# Patient Record
Sex: MALE | Race: WHITE | HISPANIC OR LATINO | URBAN - METROPOLITAN AREA
[De-identification: names, ages, dates, MRNs, and addresses within clinical notes are randomized per-mention and may not be internally consistent; named-entity substitution may affect disease eponyms.]

---

## 2023-12-27 ENCOUNTER — EMERGENCY (EMERGENCY)
Facility: HOSPITAL | Age: 63
LOS: 0 days | Discharge: HOME | End: 2023-12-27
Attending: EMERGENCY MEDICINE
Payer: COMMERCIAL

## 2023-12-27 VITALS
RESPIRATION RATE: 20 BRPM | WEIGHT: 197.98 LBS | TEMPERATURE: 99 F | HEIGHT: 66 IN | DIASTOLIC BLOOD PRESSURE: 89 MMHG | HEART RATE: 54 BPM | SYSTOLIC BLOOD PRESSURE: 168 MMHG | OXYGEN SATURATION: 97 %

## 2023-12-27 DIAGNOSIS — R20.2 PARESTHESIA OF SKIN: ICD-10-CM

## 2023-12-27 DIAGNOSIS — R20.0 ANESTHESIA OF SKIN: ICD-10-CM

## 2023-12-27 DIAGNOSIS — E11.9 TYPE 2 DIABETES MELLITUS WITHOUT COMPLICATIONS: ICD-10-CM

## 2023-12-27 DIAGNOSIS — Z88.8 ALLERGY STATUS TO OTHER DRUGS, MEDICAMENTS AND BIOLOGICAL SUBSTANCES: ICD-10-CM

## 2023-12-27 LAB
ANION GAP SERPL CALC-SCNC: 16 MMOL/L — HIGH (ref 7–14)
ANION GAP SERPL CALC-SCNC: 16 MMOL/L — HIGH (ref 7–14)
BUN SERPL-MCNC: 10 MG/DL — SIGNIFICANT CHANGE UP (ref 10–20)
BUN SERPL-MCNC: 10 MG/DL — SIGNIFICANT CHANGE UP (ref 10–20)
CALCIUM SERPL-MCNC: 9.8 MG/DL — SIGNIFICANT CHANGE UP (ref 8.4–10.5)
CALCIUM SERPL-MCNC: 9.8 MG/DL — SIGNIFICANT CHANGE UP (ref 8.4–10.5)
CHLORIDE SERPL-SCNC: 100 MMOL/L — SIGNIFICANT CHANGE UP (ref 98–110)
CHLORIDE SERPL-SCNC: 100 MMOL/L — SIGNIFICANT CHANGE UP (ref 98–110)
CO2 SERPL-SCNC: 23 MMOL/L — SIGNIFICANT CHANGE UP (ref 17–32)
CO2 SERPL-SCNC: 23 MMOL/L — SIGNIFICANT CHANGE UP (ref 17–32)
CREAT SERPL-MCNC: 0.8 MG/DL — SIGNIFICANT CHANGE UP (ref 0.7–1.5)
CREAT SERPL-MCNC: 0.8 MG/DL — SIGNIFICANT CHANGE UP (ref 0.7–1.5)
EGFR: 100 ML/MIN/1.73M2 — SIGNIFICANT CHANGE UP
EGFR: 100 ML/MIN/1.73M2 — SIGNIFICANT CHANGE UP
GLUCOSE SERPL-MCNC: 193 MG/DL — HIGH (ref 70–99)
GLUCOSE SERPL-MCNC: 193 MG/DL — HIGH (ref 70–99)
HCT VFR BLD CALC: 44.2 % — SIGNIFICANT CHANGE UP (ref 42–52)
HCT VFR BLD CALC: 44.2 % — SIGNIFICANT CHANGE UP (ref 42–52)
HGB BLD-MCNC: 14.4 G/DL — SIGNIFICANT CHANGE UP (ref 14–18)
HGB BLD-MCNC: 14.4 G/DL — SIGNIFICANT CHANGE UP (ref 14–18)
MAGNESIUM SERPL-MCNC: 2.1 MG/DL — SIGNIFICANT CHANGE UP (ref 1.8–2.4)
MAGNESIUM SERPL-MCNC: 2.1 MG/DL — SIGNIFICANT CHANGE UP (ref 1.8–2.4)
MCHC RBC-ENTMCNC: 29.5 PG — SIGNIFICANT CHANGE UP (ref 27–31)
MCHC RBC-ENTMCNC: 29.5 PG — SIGNIFICANT CHANGE UP (ref 27–31)
MCHC RBC-ENTMCNC: 32.6 G/DL — SIGNIFICANT CHANGE UP (ref 32–37)
MCHC RBC-ENTMCNC: 32.6 G/DL — SIGNIFICANT CHANGE UP (ref 32–37)
MCV RBC AUTO: 90.6 FL — SIGNIFICANT CHANGE UP (ref 80–94)
MCV RBC AUTO: 90.6 FL — SIGNIFICANT CHANGE UP (ref 80–94)
NRBC # BLD: 0 /100 WBCS — SIGNIFICANT CHANGE UP (ref 0–0)
NRBC # BLD: 0 /100 WBCS — SIGNIFICANT CHANGE UP (ref 0–0)
PLATELET # BLD AUTO: 299 K/UL — SIGNIFICANT CHANGE UP (ref 130–400)
PLATELET # BLD AUTO: 299 K/UL — SIGNIFICANT CHANGE UP (ref 130–400)
PMV BLD: 9.3 FL — SIGNIFICANT CHANGE UP (ref 7.4–10.4)
PMV BLD: 9.3 FL — SIGNIFICANT CHANGE UP (ref 7.4–10.4)
POTASSIUM SERPL-MCNC: 4.4 MMOL/L — SIGNIFICANT CHANGE UP (ref 3.5–5)
POTASSIUM SERPL-MCNC: 4.4 MMOL/L — SIGNIFICANT CHANGE UP (ref 3.5–5)
POTASSIUM SERPL-SCNC: 4.4 MMOL/L — SIGNIFICANT CHANGE UP (ref 3.5–5)
POTASSIUM SERPL-SCNC: 4.4 MMOL/L — SIGNIFICANT CHANGE UP (ref 3.5–5)
RBC # BLD: 4.88 M/UL — SIGNIFICANT CHANGE UP (ref 4.7–6.1)
RBC # BLD: 4.88 M/UL — SIGNIFICANT CHANGE UP (ref 4.7–6.1)
RBC # FLD: 13.2 % — SIGNIFICANT CHANGE UP (ref 11.5–14.5)
RBC # FLD: 13.2 % — SIGNIFICANT CHANGE UP (ref 11.5–14.5)
SODIUM SERPL-SCNC: 139 MMOL/L — SIGNIFICANT CHANGE UP (ref 135–146)
SODIUM SERPL-SCNC: 139 MMOL/L — SIGNIFICANT CHANGE UP (ref 135–146)
WBC # BLD: 11.06 K/UL — HIGH (ref 4.8–10.8)
WBC # BLD: 11.06 K/UL — HIGH (ref 4.8–10.8)
WBC # FLD AUTO: 11.06 K/UL — HIGH (ref 4.8–10.8)
WBC # FLD AUTO: 11.06 K/UL — HIGH (ref 4.8–10.8)

## 2023-12-27 PROCEDURE — 80048 BASIC METABOLIC PNL TOTAL CA: CPT

## 2023-12-27 PROCEDURE — 99284 EMERGENCY DEPT VISIT MOD MDM: CPT

## 2023-12-27 PROCEDURE — 83735 ASSAY OF MAGNESIUM: CPT

## 2023-12-27 PROCEDURE — 36415 COLL VENOUS BLD VENIPUNCTURE: CPT

## 2023-12-27 PROCEDURE — 85027 COMPLETE CBC AUTOMATED: CPT

## 2023-12-27 PROCEDURE — 99283 EMERGENCY DEPT VISIT LOW MDM: CPT

## 2023-12-27 NOTE — ED PROVIDER NOTE - CARE PROVIDERS DIRECT ADDRESSES
Color consistent with ethnicity/race, warm, dry intact, resilient. ,DirectAddress_Unknown,[email or web address] ,DirectAddress_Unknown,[email or web address]

## 2023-12-27 NOTE — ED PROVIDER NOTE - OBJECTIVE STATEMENT
63 year old male with pmhx of carpal tunnel, presents to ed with bilateral hand numbness x 4 days. Pt admits usually has numbness to 1st 2nd and 3rd digits, but now its entire hand. Pt moved 3 weeks ago and did a lot of heavy lifting. no direct trauma. no swelling or pain. no ha, dizziness, neck or back pain.

## 2023-12-27 NOTE — ED PROVIDER NOTE - NS ED ATTENDING STATEMENT MOD
This was a shared visit with the MOON. I reviewed and verified the documentation and independently performed the documented:

## 2023-12-27 NOTE — ED PROVIDER NOTE - ATTENDING APP SHARED VISIT CONTRIBUTION OF CARE
79-year-old male history of diabetes peripheral neuropathy presents for evaluation of bilateral hand tingling.  Patient reports he has been having pain and tingling in his hands the past 3 days.  He has no fever chills focal weakness numbness tingling.  Does have a mild pain in his neck.  Here exam patient with full strength of the bilateral upper extremities sensation grossly intact bilateral hands full range of motion of neck no midline spinal tenderness cranials 2-12 intact normal gait  Impression   patient with bilateral hand paresthesias.  Perhaps cervical in etiology.  Screening labs gross within normal limits outpatient follow-up workup advised.

## 2023-12-27 NOTE — ED PROVIDER NOTE - PHYSICAL EXAMINATION
GEN: Patient in no acute distress  MS: Normal ROM in all extremities. No midline spinal tenderness. pulses 2 +.   SKIN: Warm, dry, no acute rashes. Good turgor  NEURO: Strength 5/5 with no sensory deficits to bilateral extremities, radial, ulna, and medial nerve intact

## 2023-12-27 NOTE — ED PROVIDER NOTE - CARE PROVIDER_API CALL
Kaleb Montano  Orthopaedic Surgery  [address] [place] [address]  Phone: [phone]  Fax: [phone]  Follow Up Time:     Jose Lerner  Neurology  [address] [place] [address], [address]  [place] [address]  Phone: [phone]  Fax: [phone]  Follow Up Time:    Kaleb Montano  Orthopaedic Surgery  [address] [place] [address]  Phone: [phone]  Fax: [phone]  Follow Up Time:     Jose Lerner  Neurology  [address] [place] [address], [address]  [place] [address]  Phone: [phone]  Fax: [phone]  Follow Up Time:

## 2023-12-27 NOTE — ED ADULT NURSE NOTE - NSFALLUNIVINTERV_ED_ALL_ED
Bed/Stretcher in lowest position, wheels locked, appropriate side rails in place/Call bell, personal items and telephone in reach/Instruct patient to call for assistance before getting out of bed/chair/stretcher/Non-slip footwear applied when patient is off stretcher/[place] to call system/Physically safe environment - no spills, clutter or unnecessary equipment/Purposeful proactive rounding/Room/bathroom lighting operational, light cord in reach Bed/Stretcher in lowest position, wheels locked, appropriate side rails in place/Call bell, personal items and telephone in reach/Instruct patient to call for assistance before getting out of bed/chair/stretcher/Non-slip footwear applied when patient is off stretcher/[place] to call system/Physically safe environment - no spills, clutter or unnecessary equipment/Purposeful proactive rounding/Room/bathroom lighting operational, light cord in reach

## 2023-12-27 NOTE — ED PROVIDER NOTE - PATIENT PORTAL LINK FT
You can access the FollowMyHealth Patient Portal offered by [facility] by registering at the following website: [email or web address]. By joining [employer]’s FollowMyHealth portal, you will also be able to view your health information using other applications (apps) compatible with our system. You can access the FollowMyHealth Patient Portal offered by [facility] by registering at the following website: [email or web address]. By joining [employer]’s FollowMyHealth portal, you will also be able to view your health information using other applications (apps) compatible with our system.

## 2023-12-27 NOTE — ED PROVIDER NOTE - CLINICAL SUMMARY MEDICAL DECISION MAKING FREE TEXT BOX
patient with bilateral hand paresthesias.  Perhaps cervical in etiology.  Screening labs gross within normal limits outpatient follow-up workup advised.

## 2023-12-27 NOTE — ED PROVIDER NOTE - PROVIDER TOKENS
PROVIDER:[TOKEN:[[id number]]],PROVIDER:[TOKEN:[[id number]]] PROVIDER:[TOKEN:[[id number]]],PROVIDER:[TOKEN:[[id number]]]

## 2023-12-27 NOTE — ED ADULT TRIAGE NOTE - CHIEF COMPLAINT QUOTE
Patient c/o numbness to bilateral hands and all ten fingers x 3 days, patient reports history of carpel tunnel and diabetic neuropathy that previously only affected 3/5 fingers on each hand

## 2024-01-04 ENCOUNTER — NON-APPOINTMENT (OUTPATIENT)
Age: 64
End: 2024-01-04

## 2024-02-16 PROBLEM — G56.00 CARPAL TUNNEL SYNDROME, UNSPECIFIED UPPER LIMB: Chronic | Status: ACTIVE | Noted: 2023-12-27

## 2024-02-16 PROBLEM — G62.9 POLYNEUROPATHY, UNSPECIFIED: Chronic | Status: ACTIVE | Noted: 2023-12-27

## 2024-02-16 PROBLEM — E11.9 TYPE 2 DIABETES MELLITUS WITHOUT COMPLICATIONS: Chronic | Status: ACTIVE | Noted: 2023-12-27

## 2024-02-16 PROBLEM — E78.00 PURE HYPERCHOLESTEROLEMIA, UNSPECIFIED: Chronic | Status: ACTIVE | Noted: 2023-12-27

## 2024-02-27 ENCOUNTER — EMERGENCY (EMERGENCY)
Facility: HOSPITAL | Age: 64
LOS: 0 days | Discharge: HOME | End: 2024-02-27
Attending: EMERGENCY MEDICINE
Payer: COMMERCIAL

## 2024-02-27 VITALS
HEART RATE: 95 BPM | RESPIRATION RATE: 16 BRPM | DIASTOLIC BLOOD PRESSURE: 77 MMHG | OXYGEN SATURATION: 98 % | HEIGHT: 66 IN | SYSTOLIC BLOOD PRESSURE: 149 MMHG | WEIGHT: 195.99 LBS

## 2024-02-27 VITALS — TEMPERATURE: 98 F

## 2024-02-27 DIAGNOSIS — M54.2 CERVICALGIA: ICD-10-CM

## 2024-02-27 DIAGNOSIS — E78.5 HYPERLIPIDEMIA, UNSPECIFIED: ICD-10-CM

## 2024-02-27 DIAGNOSIS — Z88.8 ALLERGY STATUS TO OTHER DRUGS, MEDICAMENTS AND BIOLOGICAL SUBSTANCES: ICD-10-CM

## 2024-02-27 DIAGNOSIS — G44.89 OTHER HEADACHE SYNDROME: ICD-10-CM

## 2024-02-27 DIAGNOSIS — S09.90XA UNSPECIFIED INJURY OF HEAD, INITIAL ENCOUNTER: ICD-10-CM

## 2024-02-27 DIAGNOSIS — R51.9 HEADACHE, UNSPECIFIED: ICD-10-CM

## 2024-02-27 DIAGNOSIS — M10.9 GOUT, UNSPECIFIED: ICD-10-CM

## 2024-02-27 DIAGNOSIS — E11.9 TYPE 2 DIABETES MELLITUS WITHOUT COMPLICATIONS: ICD-10-CM

## 2024-02-27 PROCEDURE — 70486 CT MAXILLOFACIAL W/O DYE: CPT | Mod: 26,MC

## 2024-02-27 PROCEDURE — 99284 EMERGENCY DEPT VISIT MOD MDM: CPT

## 2024-02-27 PROCEDURE — 72125 CT NECK SPINE W/O DYE: CPT | Mod: MC

## 2024-02-27 PROCEDURE — 70450 CT HEAD/BRAIN W/O DYE: CPT | Mod: 26,MC

## 2024-02-27 PROCEDURE — 72125 CT NECK SPINE W/O DYE: CPT | Mod: 26,MC

## 2024-02-27 PROCEDURE — 70486 CT MAXILLOFACIAL W/O DYE: CPT | Mod: MC

## 2024-02-27 PROCEDURE — 99284 EMERGENCY DEPT VISIT MOD MDM: CPT | Mod: 25

## 2024-02-27 PROCEDURE — 70450 CT HEAD/BRAIN W/O DYE: CPT | Mod: MC

## 2024-02-27 RX ORDER — ACETAMINOPHEN 500 MG
650 TABLET ORAL ONCE
Refills: 0 | Status: COMPLETED | OUTPATIENT
Start: 2024-02-27 | End: 2024-02-27

## 2024-02-27 RX ADMIN — Medication 650 MILLIGRAM(S): at 05:00

## 2024-02-27 NOTE — ED PROVIDER NOTE - CARE PROVIDER_API CALL
PCP,   your pcp  Phone: (   )    -  Fax: (   )    -  Established Patient  Follow Up Time: 7-10 Days

## 2024-02-27 NOTE — ED ADULT NURSE NOTE - CHIEF COMPLAINT QUOTE
pt presents c/o headache, nasal pain and upper back pain s/p falling on 2/11/24. [address] Comprehensive 47556 Critical Care - 30 to 74 minutes

## 2024-02-27 NOTE — ED PROVIDER NOTE - PROVIDER TOKENS
FREE:[LAST:[PCP],PHONE:[(   )    -],FAX:[(   )    -],ADDRESS:[your pcp],FOLLOWUP:[7-10 Days],ESTABLISHEDPATIENT:[T]]

## 2024-02-27 NOTE — ED PROVIDER NOTE - ATTENDING CONTRIBUTION TO CARE
64M pmh dm, hl, gout p/w intermitt ha, nasal pain & neck pain s/p mechanical fall w/facial trauma 2/11. Tripped while walking down stairs at that time, fell fwd & hit front of face on concrete. No loc. With pain as states previously since that time. Denies vision changes, rhinorrhea, dental pain, cp/sob, nv, abd pain, focal numbness or weakness.    PE: as per resident note

## 2024-02-27 NOTE — ED PROVIDER NOTE - PHYSICAL EXAMINATION
CONST: well appearing for age, NAD  HEAD:  normocephalic, atraumatic  EYES:  conjunctivae without injection, drainage or discharge  ENMT: nasal mucosa moist; mouth moist without ulcerations or lesions; throat moist without erythema, exudate, ulcerations or lesions  NECK:  supple  CARDIAC:  regular rate and rhythm, normal S1 and S2, no murmurs, rubs or gallops  RESP:  respiratory rate and effort appear normal for age; lungs are clear to auscultation bilaterally; no rales or wheezes  ABDOMEN:  soft, nontender, nondistended  MUSCULOSKELETAL/NEURO:  awake and alert, CN II-XII grossly intact, sensation intact throughout, 5/5 strength in all extremities, steady gait, normal movement, normal tone  SKIN:  normal skin color for age and race, well-perfused; warm and dry

## 2024-02-27 NOTE — ED PROVIDER NOTE - CLINICAL SUMMARY MEDICAL DECISION MAKING FREE TEXT BOX
Labs, EKG and imaging were ordered, where indicated.  Independent interpretation of any labs, EKG & imaging that was ordered was performed by me, Dr. Torrez. Appropriate medications for patient's presenting complaints were ordered and effects were reassessed, where indicated.  Patient's records (prior hospital, ED visit, and/or nursing home note) were reviewed, if available.  Additional history was obtained from EMS, family, and/or PCP (where available).  Escalation to admission/observation was considered.  However patient feels much better and patient/parent is comfortable with discharge.  Appropriate follow-up was arranged.     facial pain, ha, neck pain s/p mechanical fall ~1.5 wks prior - avss, neuro exam nf - ct imaging neg for acute injuries - all results d/w pt & copies given, strict return precautions discussed, rec outpt PCP f/u

## 2024-02-27 NOTE — ED PROVIDER NOTE - OBJECTIVE STATEMENT
Pt is a 65 y/o male with PMH of DM, HLD and gout presenting for headache. Pt reports on 2/11, he was walking down his stairs outside his house when he tripped down the last step and landed face first onto the concrete. + Head trauma, no LOC, no blood thinners. Reports that since then he has had pain to the bridge of his nose and his forehead and neck. No back pain. No chest pain or SOB. Has been ambulating well. Did not take anything for pain.

## 2024-02-27 NOTE — ED PROVIDER NOTE - PATIENT PORTAL LINK FT
You can access the FollowMyHealth Patient Portal offered by [facility] by registering at the following website: [email or web address]. By joining [employer]’s FollowMyHealth portal, you will also be able to view your health information using other applications (apps) compatible with our system.

## 2024-03-27 PROBLEM — Z00.00 ENCOUNTER FOR PREVENTIVE HEALTH EXAMINATION: Status: ACTIVE | Noted: 2024-03-27

## 2024-04-10 ENCOUNTER — APPOINTMENT (OUTPATIENT)
Dept: ORTHOPEDIC SURGERY | Facility: CLINIC | Age: 64
End: 2024-04-10

## 2024-04-11 ENCOUNTER — APPOINTMENT (OUTPATIENT)
Dept: ORTHOPEDIC SURGERY | Facility: CLINIC | Age: 64
End: 2024-04-11
Payer: COMMERCIAL

## 2024-04-11 VITALS — WEIGHT: 198 LBS | HEIGHT: 66 IN | BODY MASS INDEX: 31.82 KG/M2

## 2024-04-11 PROCEDURE — 99203 OFFICE O/P NEW LOW 30 MIN: CPT

## 2024-04-11 RX ORDER — ALLOPURINOL 200 MG/1
TABLET ORAL
Refills: 0 | Status: ACTIVE | COMMUNITY

## 2024-04-11 RX ORDER — ROSUVASTATIN CALCIUM 5 MG/1
TABLET, FILM COATED ORAL
Refills: 0 | Status: ACTIVE | COMMUNITY

## 2024-04-11 RX ORDER — ERTUGLIFLOZIN AND SITAGLIPTIN 5; 100 MG/1; MG/1
5-100 TABLET, FILM COATED ORAL
Refills: 0 | Status: ACTIVE | COMMUNITY

## 2024-04-11 NOTE — HISTORY OF PRESENT ILLNESS
[de-identified] : Patient is a 64-year-old male here for evaluation of his bilateral wrists. He has been having pain for the last couple of years.  It is starting to get worse.  He was diagnosed with neuropathy about a year ago by a neurologist.  He has numbness and tingling in all of his fingers.  He goes up and down his arms.

## 2024-04-11 NOTE — DISCUSSION/SUMMARY
[de-identified] :  the patient is experiencing symptoms of carpal tunnel syndrome and cervical radiculopathy.  He states that he had an EMG done about a year ago from the neurologist.  I recommend that he gets those results for us to review.  In the meantime, to help some of his acute symptoms, I provided him with bilateral cock-up wrist braces I wrote him a prescription for meloxicam 15 mg 1 tab once a day for the inflammation.  He will see Dr. Segura in a few weeks with those results to discuss potentially doing carpal tunnel releases.  All questions were answered today.  He will contact the office with any questions or concerns.

## 2024-04-11 NOTE — PHYSICAL EXAM
[de-identified] : Physical exam of his bilateral hands: Mild swelling.  Negative ecchymosis.  Nontender over the distal radius or distal ulna.  He has a positive Tinel, positive Phalen, positive median nerve compression.  He can make a full fist bilaterally.  He has decreased strength bilaterally.

## 2024-04-24 DIAGNOSIS — G56.03 CARPAL TUNNEL SYNDROM,BILATERAL UPPER LIMBS: ICD-10-CM

## 2024-05-07 ENCOUNTER — APPOINTMENT (OUTPATIENT)
Dept: NEUROLOGY | Facility: CLINIC | Age: 64
End: 2024-05-07
Payer: COMMERCIAL

## 2024-05-07 PROCEDURE — 95886 MUSC TEST DONE W/N TEST COMP: CPT

## 2024-05-07 PROCEDURE — 95912 NRV CNDJ TEST 11-12 STUDIES: CPT

## 2024-05-10 ENCOUNTER — APPOINTMENT (OUTPATIENT)
Dept: ORTHOPEDIC SURGERY | Facility: CLINIC | Age: 64
End: 2024-05-10
Payer: COMMERCIAL

## 2024-05-10 DIAGNOSIS — G56.02 CARPAL TUNNEL SYNDROME, LEFT UPPER LIMB: ICD-10-CM

## 2024-05-10 DIAGNOSIS — G56.22 LESION OF ULNAR NERVE, LEFT UPPER LIMB: ICD-10-CM

## 2024-05-10 DIAGNOSIS — G56.21 LESION OF ULNAR NERVE, RIGHT UPPER LIMB: ICD-10-CM

## 2024-05-10 DIAGNOSIS — G56.01 CARPAL TUNNEL SYNDROME, RIGHT UPPER LIMB: ICD-10-CM

## 2024-05-10 PROCEDURE — 99202 OFFICE O/P NEW SF 15 MIN: CPT

## 2024-05-10 NOTE — DATA REVIEWED
[FreeTextEntry1] : His EMG test was reviewed shows bilateral cubital tunnel syndrome as well as left-sided carpal tunnel syndrome right-sided sewing some stuff in the neck

## 2024-05-10 NOTE — PHYSICAL EXAM
[de-identified] : Patient has intrinsic wasting on the left side compared to the right side.  Patient is good range of motion to the elbow wrist and hand on both hands.  He has positive Wartenberg's sign on the left negative on the right he has positive Froment's sign on the left negative on the right he has positive Tinel's across both elbows positive fluid flexion supination test on both elbows he has good thenar strength no thenar atrophy he does have mildly positive Tinel's across the left wrist.  Not really present on the right wrist.

## 2024-05-10 NOTE — ASSESSMENT
[FreeTextEntry1] : Patient has bilateral cubital tunnel syndrome on the left side carpal tunnel syndrome.  Is evidence of carpal tunnel on the right though the EMG test does not show this.  He wants to be set up for surgery for a left cubital tunnel decompression possible transposition and carpal tunnel releases be done under general anesthesia risk and benefits of surgery including possible decompression possible transposition was discussed incomplete results permanent weakness of the muscles discussed permanent numbness discussed he understands he will see us back the time of surgical intervention we did discuss nerve transfer as well.

## 2024-05-10 NOTE — HISTORY OF PRESENT ILLNESS
[de-identified] : 64-year-old male pain discomfort numbness and tingling in both hands.  The left side is worse than the right side.  And he comes in for evaluation today.  He did have an EMG test done.  He wakes from sleep most nights with numbness and tingling to fingers.  The numbness and tingling to be on both sides of his hands.  It involves on his left side the index finger middle finger ring finger thumb and little finger.  On the right hand it is really more the radial fingers that bother him.  He does report clumsiness with use of the left hand

## 2024-06-17 ENCOUNTER — APPOINTMENT (OUTPATIENT)
Dept: ENDOCRINOLOGY | Facility: CLINIC | Age: 64
End: 2024-06-17

## 2024-07-03 ENCOUNTER — OUTPATIENT (OUTPATIENT)
Dept: OUTPATIENT SERVICES | Facility: HOSPITAL | Age: 64
LOS: 1 days | End: 2024-07-03
Payer: COMMERCIAL

## 2024-07-03 VITALS
TEMPERATURE: 98 F | OXYGEN SATURATION: 98 % | HEIGHT: 66 IN | WEIGHT: 201.94 LBS | SYSTOLIC BLOOD PRESSURE: 124 MMHG | DIASTOLIC BLOOD PRESSURE: 79 MMHG | HEART RATE: 82 BPM | RESPIRATION RATE: 15 BRPM

## 2024-07-03 DIAGNOSIS — Z01.818 ENCOUNTER FOR OTHER PREPROCEDURAL EXAMINATION: ICD-10-CM

## 2024-07-03 DIAGNOSIS — G56.22 LESION OF ULNAR NERVE, LEFT UPPER LIMB: ICD-10-CM

## 2024-07-03 DIAGNOSIS — Z98.890 OTHER SPECIFIED POSTPROCEDURAL STATES: Chronic | ICD-10-CM

## 2024-07-03 LAB
A1C WITH ESTIMATED AVERAGE GLUCOSE RESULT: 7 % — HIGH (ref 4–5.6)
ALBUMIN SERPL ELPH-MCNC: 4.3 G/DL — SIGNIFICANT CHANGE UP (ref 3.5–5.2)
ALP SERPL-CCNC: 113 U/L — SIGNIFICANT CHANGE UP (ref 30–115)
ALT FLD-CCNC: 72 U/L — HIGH (ref 0–41)
ANION GAP SERPL CALC-SCNC: 16 MMOL/L — HIGH (ref 7–14)
AST SERPL-CCNC: 81 U/L — HIGH (ref 0–41)
BILIRUB SERPL-MCNC: 0.4 MG/DL — SIGNIFICANT CHANGE UP (ref 0.2–1.2)
BUN SERPL-MCNC: 10 MG/DL — SIGNIFICANT CHANGE UP (ref 10–20)
CALCIUM SERPL-MCNC: 9.2 MG/DL — SIGNIFICANT CHANGE UP (ref 8.4–10.5)
CHLORIDE SERPL-SCNC: 99 MMOL/L — SIGNIFICANT CHANGE UP (ref 98–110)
CO2 SERPL-SCNC: 23 MMOL/L — SIGNIFICANT CHANGE UP (ref 17–32)
CREAT SERPL-MCNC: 0.8 MG/DL — SIGNIFICANT CHANGE UP (ref 0.7–1.5)
EGFR: 99 ML/MIN/1.73M2 — SIGNIFICANT CHANGE UP
ESTIMATED AVERAGE GLUCOSE: 154 MG/DL — HIGH (ref 68–114)
GLUCOSE SERPL-MCNC: 162 MG/DL — HIGH (ref 70–99)
HCT VFR BLD CALC: 44.7 % — SIGNIFICANT CHANGE UP (ref 42–52)
HGB BLD-MCNC: 14.9 G/DL — SIGNIFICANT CHANGE UP (ref 14–18)
MCHC RBC-ENTMCNC: 30.3 PG — SIGNIFICANT CHANGE UP (ref 27–31)
MCHC RBC-ENTMCNC: 33.3 G/DL — SIGNIFICANT CHANGE UP (ref 32–37)
MCV RBC AUTO: 91 FL — SIGNIFICANT CHANGE UP (ref 80–94)
NRBC # BLD: 0 /100 WBCS — SIGNIFICANT CHANGE UP (ref 0–0)
PLATELET # BLD AUTO: 227 K/UL — SIGNIFICANT CHANGE UP (ref 130–400)
PMV BLD: 9.6 FL — SIGNIFICANT CHANGE UP (ref 7.4–10.4)
POTASSIUM SERPL-MCNC: 4.4 MMOL/L — SIGNIFICANT CHANGE UP (ref 3.5–5)
POTASSIUM SERPL-SCNC: 4.4 MMOL/L — SIGNIFICANT CHANGE UP (ref 3.5–5)
PROT SERPL-MCNC: 7.2 G/DL — SIGNIFICANT CHANGE UP (ref 6–8)
RBC # BLD: 4.91 M/UL — SIGNIFICANT CHANGE UP (ref 4.7–6.1)
RBC # FLD: 13.5 % — SIGNIFICANT CHANGE UP (ref 11.5–14.5)
SODIUM SERPL-SCNC: 138 MMOL/L — SIGNIFICANT CHANGE UP (ref 135–146)
WBC # BLD: 6.08 K/UL — SIGNIFICANT CHANGE UP (ref 4.8–10.8)
WBC # FLD AUTO: 6.08 K/UL — SIGNIFICANT CHANGE UP (ref 4.8–10.8)

## 2024-07-03 PROCEDURE — 36415 COLL VENOUS BLD VENIPUNCTURE: CPT

## 2024-07-03 PROCEDURE — 85027 COMPLETE CBC AUTOMATED: CPT

## 2024-07-03 PROCEDURE — 80053 COMPREHEN METABOLIC PANEL: CPT

## 2024-07-03 PROCEDURE — 93010 ELECTROCARDIOGRAM REPORT: CPT

## 2024-07-03 PROCEDURE — 99214 OFFICE O/P EST MOD 30 MIN: CPT | Mod: 25

## 2024-07-03 PROCEDURE — 93005 ELECTROCARDIOGRAM TRACING: CPT

## 2024-07-03 PROCEDURE — 83036 HEMOGLOBIN GLYCOSYLATED A1C: CPT

## 2024-07-03 RX ORDER — ALLOPURINOL 300 MG/1
0 TABLET ORAL
Refills: 0 | DISCHARGE

## 2024-07-03 RX ORDER — INDOMETHACIN 75 MG/1
1 CAPSULE, EXTENDED RELEASE ORAL
Refills: 0 | DISCHARGE

## 2024-07-03 RX ORDER — ERTUGLIFLOZIN AND SITAGLIPTIN 15; 100 MG/1; MG/1
1 TABLET, FILM COATED ORAL
Refills: 0 | DISCHARGE

## 2024-07-03 RX ORDER — ROSUVASTATIN CALCIUM 20 MG/1
1 TABLET ORAL
Refills: 0 | DISCHARGE

## 2024-07-04 DIAGNOSIS — Z01.818 ENCOUNTER FOR OTHER PREPROCEDURAL EXAMINATION: ICD-10-CM

## 2024-07-04 DIAGNOSIS — G56.22 LESION OF ULNAR NERVE, LEFT UPPER LIMB: ICD-10-CM

## 2024-07-17 ENCOUNTER — APPOINTMENT (OUTPATIENT)
Dept: ORTHOPEDIC SURGERY | Facility: AMBULATORY SURGERY CENTER | Age: 64
End: 2024-07-17

## 2024-07-29 ENCOUNTER — APPOINTMENT (OUTPATIENT)
Dept: ORTHOPEDIC SURGERY | Facility: CLINIC | Age: 64
End: 2024-07-29

## 2024-08-02 ENCOUNTER — TRANSCRIPTION ENCOUNTER (OUTPATIENT)
Age: 64
End: 2024-08-02

## 2024-08-02 ENCOUNTER — APPOINTMENT (OUTPATIENT)
Dept: ORTHOPEDIC SURGERY | Facility: HOSPITAL | Age: 64
End: 2024-08-02

## 2024-08-02 DIAGNOSIS — G56.02 CARPAL TUNNEL SYNDROME, LEFT UPPER LIMB: ICD-10-CM

## 2024-08-02 DIAGNOSIS — G56.22 LESION OF ULNAR NERVE, LEFT UPPER LIMB: ICD-10-CM

## 2024-08-12 ENCOUNTER — APPOINTMENT (OUTPATIENT)
Dept: ORTHOPEDIC SURGERY | Facility: CLINIC | Age: 64
End: 2024-08-12
Payer: COMMERCIAL

## 2024-08-12 DIAGNOSIS — G56.02 CARPAL TUNNEL SYNDROME, LEFT UPPER LIMB: ICD-10-CM

## 2024-08-12 DIAGNOSIS — G56.22 LESION OF ULNAR NERVE, LEFT UPPER LIMB: ICD-10-CM

## 2024-08-12 PROCEDURE — 99024 POSTOP FOLLOW-UP VISIT: CPT

## 2024-08-12 NOTE — HISTORY OF PRESENT ILLNESS
[de-identified] : Patient is a 64 year M here for his first PO appt. He is status post a left OCTR and cubital tunnel release done by Dr. Segura. He is doing well.

## 2024-08-12 NOTE — PHYSICAL EXAM
[de-identified] : Physical exam of his right wrist & elbow: Resolving swelling and ecchymosis. The wound is clean and dry. No signs of drainage, pus, or infection.  Good range of motion of the elbow, wrist and hand. Sensory and motor are intact.

## 2024-08-12 NOTE — DISCUSSION/SUMMARY
[de-identified] : Sutures were removed.  I recommend Epson salt and warm water soaks with flexion and extension exercises and scar massage of the elbow and hand.  Patient understands numbness or tingling can persist for up to 6 months. Any residual numbness and tingling after 6 months is permanent.  He will contact the office with any problems.

## 2024-08-21 NOTE — ED ADULT NURSE NOTE - CCCP TRG CHIEF CMPLNT
How Severe Is Your Skin Lesion?: mild Has Your Skin Lesion Been Treated?: not been treated Is This A New Presentation, Or A Follow-Up?: Skin Lesion multiple medical complaints

## 2024-09-10 ENCOUNTER — APPOINTMENT (OUTPATIENT)
Dept: ORTHOPEDIC SURGERY | Facility: CLINIC | Age: 64
End: 2024-09-10